# Patient Record
Sex: MALE | URBAN - METROPOLITAN AREA
[De-identification: names, ages, dates, MRNs, and addresses within clinical notes are randomized per-mention and may not be internally consistent; named-entity substitution may affect disease eponyms.]

---

## 2018-08-31 ENCOUNTER — TRANSFERRED RECORDS (OUTPATIENT)
Dept: HEALTH INFORMATION MANAGEMENT | Facility: CLINIC | Age: 73
End: 2018-08-31

## 2018-09-17 ENCOUNTER — TRANSFERRED RECORDS (OUTPATIENT)
Dept: HEALTH INFORMATION MANAGEMENT | Facility: CLINIC | Age: 73
End: 2018-09-17

## 2018-09-20 ENCOUNTER — TRANSFERRED RECORDS (OUTPATIENT)
Dept: HEALTH INFORMATION MANAGEMENT | Facility: CLINIC | Age: 73
End: 2018-09-20

## 2018-09-26 ENCOUNTER — TRANSFERRED RECORDS (OUTPATIENT)
Dept: HEALTH INFORMATION MANAGEMENT | Facility: CLINIC | Age: 73
End: 2018-09-26

## 2018-10-09 ENCOUNTER — TRANSFERRED RECORDS (OUTPATIENT)
Dept: HEALTH INFORMATION MANAGEMENT | Facility: CLINIC | Age: 73
End: 2018-10-09

## 2018-10-18 ENCOUNTER — PRE VISIT (OUTPATIENT)
Dept: UROLOGY | Facility: CLINIC | Age: 73
End: 2018-10-18

## 2018-10-25 NOTE — TELEPHONE ENCOUNTER
MEDICAL RECORDS REQUEST   [facility]  Urology Clinic  [address] [place] [address]  PHONE: [phone]  Fax: [phone]        FUTURE VISIT INFORMATION                                                   Julio Cesar Malin, : 1945 scheduled for future visit at [facility]    APPOINTMENT INFORMATION:    Date: 2018    Provider:  Indra Raymundo    Reason for Visit/Diagnosis: Kidney stones    REFERRAL INFORMATION:    Referring provider:  George cardoso    Specialty: MD    Referring providers clinic: [facility] contact number:  [phone]    RECORDS REQUESTED FOR VISIT                                                     NOTES  STATUS/DETAILS   OFFICE NOTE from referring provider  yes   OFFICE NOTE from other specialist  yes   DISCHARGE SUMMARY from hospital  no   DISCHARGE REPORT from the ER  no   OPERATIVE REPORT  no   MEDICATION LIST  yes   KIDNEY STONE     CT STONE  in process   IMAGING (IMAGES & REPORT)  in process       PRE-VISIT CHECKLIST      Record collection complete If no, please explain in process   Appointment appropriately scheduled           (right time/right provider) Yes   MyChart activation If no, please explain in process   Questionnaire complete If no, please explain in process     Completed by: Neelam Ibrahim

## 2018-10-26 ENCOUNTER — PRE VISIT (OUTPATIENT)
Dept: UROLOGY | Facility: CLINIC | Age: 73
End: 2018-10-26

## 2018-10-30 ENCOUNTER — OFFICE VISIT (OUTPATIENT)
Dept: UROLOGY | Facility: CLINIC | Age: 73
End: 2018-10-30
Payer: COMMERCIAL

## 2018-10-30 ENCOUNTER — PATIENT OUTREACH (OUTPATIENT)
Dept: CARE COORDINATION | Facility: CLINIC | Age: 73
End: 2018-10-30

## 2018-10-30 ENCOUNTER — RADIANT APPOINTMENT (OUTPATIENT)
Dept: CT IMAGING | Facility: CLINIC | Age: 73
End: 2018-10-30
Attending: UROLOGY
Payer: COMMERCIAL

## 2018-10-30 VITALS
DIASTOLIC BLOOD PRESSURE: 70 MMHG | BODY MASS INDEX: 26.68 KG/M2 | HEIGHT: 67 IN | HEART RATE: 57 BPM | SYSTOLIC BLOOD PRESSURE: 126 MMHG | WEIGHT: 170 LBS

## 2018-10-30 DIAGNOSIS — N20.0 CALCULUS OF KIDNEY: ICD-10-CM

## 2018-10-30 DIAGNOSIS — N20.0 CALCULUS OF KIDNEY: Primary | ICD-10-CM

## 2018-10-30 LAB
ALBUMIN UR-MCNC: NEGATIVE MG/DL
ANION GAP SERPL CALCULATED.3IONS-SCNC: 5 MMOL/L (ref 3–14)
APPEARANCE UR: CLEAR
BILIRUB UR QL STRIP: NEGATIVE
BUN SERPL-MCNC: 13 MG/DL (ref 7–30)
CALCIUM SERPL-MCNC: 8.7 MG/DL (ref 8.5–10.1)
CHLORIDE SERPL-SCNC: 107 MMOL/L (ref 94–109)
CO2 SERPL-SCNC: 29 MMOL/L (ref 20–32)
COLOR UR AUTO: ABNORMAL
CREAT SERPL-MCNC: 0.99 MG/DL (ref 0.66–1.25)
ERYTHROCYTE [DISTWIDTH] IN BLOOD BY AUTOMATED COUNT: 12.8 % (ref 10–15)
GFR SERPL CREATININE-BSD FRML MDRD: 74 ML/MIN/1.7M2
GLUCOSE SERPL-MCNC: 89 MG/DL (ref 70–99)
GLUCOSE UR STRIP-MCNC: NEGATIVE MG/DL
HCT VFR BLD AUTO: 47.7 % (ref 40–53)
HGB BLD-MCNC: 15.5 G/DL (ref 13.3–17.7)
HGB UR QL STRIP: ABNORMAL
KETONES UR STRIP-MCNC: NEGATIVE MG/DL
LEUKOCYTE ESTERASE UR QL STRIP: NEGATIVE
MCH RBC QN AUTO: 33 PG (ref 26.5–33)
MCHC RBC AUTO-ENTMCNC: 32.5 G/DL (ref 31.5–36.5)
MCV RBC AUTO: 102 FL (ref 78–100)
MUCOUS THREADS #/AREA URNS LPF: PRESENT /LPF
NITRATE UR QL: NEGATIVE
PH UR STRIP: 6 PH (ref 5–7)
PLATELET # BLD AUTO: 200 10E9/L (ref 150–450)
POTASSIUM SERPL-SCNC: 4.3 MMOL/L (ref 3.4–5.3)
RBC # BLD AUTO: 4.69 10E12/L (ref 4.4–5.9)
RBC #/AREA URNS AUTO: 2 /HPF (ref 0–2)
SODIUM SERPL-SCNC: 141 MMOL/L (ref 133–144)
SOURCE: ABNORMAL
SP GR UR STRIP: 1.01 (ref 1–1.03)
UROBILINOGEN UR STRIP-MCNC: 0 MG/DL (ref 0–2)
WBC # BLD AUTO: 6.7 10E9/L (ref 4–11)
WBC #/AREA URNS AUTO: 1 /HPF (ref 0–5)

## 2018-10-30 RX ORDER — ATORVASTATIN CALCIUM 80 MG/1
TABLET, FILM COATED ORAL
COMMUNITY
Start: 2018-10-23

## 2018-10-30 RX ORDER — TAMSULOSIN HYDROCHLORIDE 0.4 MG/1
CAPSULE ORAL
COMMUNITY
Start: 2018-08-31

## 2018-10-30 RX ORDER — NITROGLYCERIN 0.4 MG/1
TABLET SUBLINGUAL
COMMUNITY
Start: 2018-01-22

## 2018-10-30 RX ORDER — LEVOTHYROXINE SODIUM 75 UG/1
TABLET ORAL
COMMUNITY
Start: 2018-10-23

## 2018-10-30 RX ORDER — METOPROLOL SUCCINATE 25 MG/1
TABLET, EXTENDED RELEASE ORAL
COMMUNITY
Start: 2018-10-23

## 2018-10-30 ASSESSMENT — ENCOUNTER SYMPTOMS
HEMATURIA: 0
BOWEL INCONTINENCE: 0
DIFFICULTY URINATING: 0
FLANK PAIN: 1
BLOATING: 0
RECTAL PAIN: 0
ABDOMINAL PAIN: 1
VOMITING: 1
CONSTIPATION: 0
DIARRHEA: 1
HEARTBURN: 1
NAUSEA: 1
BLOOD IN STOOL: 0
JAUNDICE: 0
DYSURIA: 1

## 2018-10-30 ASSESSMENT — PAIN SCALES - GENERAL: PAINLEVEL: NO PAIN (0)

## 2018-10-30 NOTE — PATIENT INSTRUCTIONS
Dr. Raymundo will contact you regarding a surgery date/time.    It was a pleasure meeting with you today.  Thank you for allowing me and my team the privilege of caring for you today.  YOU are the reason we are here, and I truly hope we provided you with the excellent service you deserve.  Please let us know if there is anything else we can do for you so that we can be sure you are leaving completely satisfied with your care experience.        JANNA Sevilla

## 2018-10-30 NOTE — NURSING NOTE
Chief Complaint   Patient presents with     Consult     New patient consult for kidney stones     Amanda Martinez, A

## 2018-10-30 NOTE — MR AVS SNAPSHOT
After Visit Summary   10/30/2018    Julio Cesar Malin    MRN: [id number]           Patient Information     Date Of Birth          1945        Visit Information        Provider Department      10/30/2018 12:20 PM Indra Raymundo MD [facility]        Today's Diagnoses     Calculus of kidney    -  1      Care Instructions    Dr. Raymundo will contact you regarding a surgery date/time.    It was a pleasure meeting with you today.  Thank you for allowing me and my team the privilege of caring for you today.  YOU are the reason we are here, and I truly hope we provided you with the excellent service you deserve.  Please let us know if there is anything else we can do for you so that we can be sure you are leaving completely satisfied with your care experience.        Amanad Martinez ERIKA          Follow-ups after your visit        Additional Services     PAC Visit Referral (For [facility] Only)       Does this visit require an Anesthesia consult?  Yes - Evaluate for medical necessity related to one of the following conditions:  Hx of cardiac stent    H&P done by:  PCP      Please be aware that coverage of these services is subject to the terms and limitations of your health insurance plan.  Call member services at your health plan with any benefit or coverage questions.      Please bring the following to your appointment:  >>   Any x-rays, CTs or MRIs which have been performed.  Contact the facility where they were done to arrange for  prior to your scheduled appointment.  Any new CT, MRI or other procedures ordered by your specialist must be performed at a [place] facility or coordinated by your clinic's referral office.    >>   List of current medications  >>   This referral request   >>   Any documents/labs given to you for this referral                  Your next 10 appointments already scheduled     Oct 30, 2018  8:00 PM CDT   CT ABDOMEN PELVIS  W/O CONTRAST with UCCT1   [facility] ([facility])    [address]  1st Floor  [place] [address] [phone]           How do I prepare for my exam? (Food and drink instructions) No Food and Drink Restrictions.  How do I prepare for my exam? (Other instructions) You do not need to do anything special to prepare for this exam. For a sinus scan: Use your nose spray (nasal decongestant spray) as directed.  What should I wear: Please wear loose clothing, such as a sweat suit or jogging clothes. Avoid snaps, zippers and other metal. We may ask you to undress and put on a hospital gown.  How long does the exam take: Most scans take less than 20 minutes.  What should I bring: Please bring any scans or X-rays taken at other hospitals, if similar tests were done. Also bring a list of your medicines, including vitamins, minerals and over-the-counter drugs. It is safest to leave personal items at home.  Do I need a : No  is needed.  What do I need to tell my doctor? Be sure to tell your doctor: * If you have any allergies. * If there s any chance you are pregnant. * If you are breastfeeding.  What should I do after the exam: No restrictions, you may resume normal activities.  What is this test: A CT (computed tomography) scan is a series of pictures that allows us to look inside your body. The scanner creates images of the body in cross sections, much like slices of bread. This helps us see any problems more clearly.  Who should I call with questions: If you have any questions, please call the Imaging Department where you will have your exam. Directions, parking instructions, and other information are available on our website, [email or web address].              Who to contact     Please call your clinic at [phone] to:    Ask questions about your health    Make or cancel appointments    Discuss your medicines    Learn about your test results    Speak to  "your doctor            Additional Information About Your Visit        MyChart Information     [employer] is an electronic gateway that provides easy, online access to your medical records. With [employer], you can request a clinic appointment, read your test results, renew a prescription or communicate with your care team.     To sign up for [employer] visit the website at [email or web address]   You will be asked to enter the access code listed below, as well as some personal information. Please follow the directions to create your username and password.     Your access code is: [email or web address]  Expires: 2019  6:30 AM     Your access code will  in 90 days. If you need help or a new code, please contact your [facility] or call [phone] for assistance.        Care EveryWhere ID     This is your Care EveryWhere ID. This could be used by other organizations to access your [facility] medical records  [id number]        Your Vitals Were     Pulse Height BMI (Body Mass Index)             57 1.689 m (5' 6.5\") 27.03 kg/m2          Blood Pressure from Last 3 Encounters:   10/30/18 126/70    Weight from Last 3 Encounters:   10/30/18 77.1 kg (170 lb)              We Performed the Following     PAC Visit Referral (For [facility] Only)     Yaima-Operative Worksheet (Urology)     Urine Culture Aerobic Bacterial        Primary Care Provider    None Specified       No primary provider on file.        Equal Access to Services     CHARBEL MORALEZ : Hadii ade Arriaza, waaxda luqadaha, qaybta kaalmasamson song . So [place] -[phone].    ATENCIÓN: Si habla español, tiene a panchal disposición servicios gratuitos de asistencia lingüística. Carlene al [phone].    We comply with applicable federal civil rights laws and [place] laws. We do not discriminate on the basis of race, color, national origin, age, disability, sex, sexual orientation, or gender " identity.            Thank you!     Thank you for choosing [facility]OLOG[facility] AND [facility]  for your care. Our goal is always to provide you with excellent care. Hearing back from our patients is one way we can continue to improve our services. Please take a few minutes to complete the written survey that you may receive in the mail after your visit with us. Thank you!             Your Updated Medication List - Protect others around you: Learn how to safely use, store and throw away your medicines at www.disposemymeds.org.          This list is accurate as of 10/30/18  2:14 PM.  Always use your most recent med list.                   Brand Name Dispense Instructions for use Diagnosis    atorvastatin 80 MG tablet    LIPITOR          levothyroxine 75 MCG tablet    SYNTHROID/LEVOTHROID          metFORMIN 500 MG tablet    GLUCOPHAGE          metoprolol succinate 25 MG 24 hr tablet    TOPROL-XL          nitroGLYcerin 0.4 MG sublingual tablet    NITROSTAT          tamsulosin 0.4 MG capsule    FLOMAX

## 2018-10-30 NOTE — PROGRESS NOTES
Chief Complaint:   Kidney Stone         History of Present Illness:    Julio Cesar Malin is a very pleasant 73 year old male who presents with a history of left large renal stones.[place] he has previously had attempted but incomplete treatment of his stones via a ureteroscopic approach. He underwent left ureteral stent placement on 9/7/18, no ureteroscopy at that time due to presumed UTI.  He was treated and then ureteroscopy attempted on 9/17/18 with incomplete treatment of the stone.  Stent has since been removed.  He was advised to be seen at an academic center and chose [facility] as [place]    Currently describes intermittent pain on left side.  Denies hematuria, dysuria, fevers, chills.    First stone: Current stones  Number of stone surgeries: Ureteroscopy 9/2018  Number of stones passed spontaneously: 0  History of UTI: Denies  Prior Stone Analysis: No records of this  Family History Nephrolithasis: Denies  Stone Risk Factors: none  Prior Metabolic Workup: no           Past Medical History:   CABG  Glucose intolerance         Past Surgical History:   Ureteroscopy  Heel surgery 004         Medications     Current Outpatient Prescriptions   Medication     atorvastatin (LIPITOR) 80 MG tablet     levothyroxine (SYNTHROID/LEVOTHROID) 75 MCG tablet     metFORMIN (GLUCOPHAGE) 500 MG tablet     metoprolol succinate (TOPROL-XL) 25 MG 24 hr tablet     nitroGLYcerin (NITROSTAT) 0.4 MG sublingual tablet     tamsulosin (FLOMAX) 0.4 MG capsule     No current facility-administered medications for this visit.             Family History:   History reviewed. No pertinent family history.         Social History:     Social History     Social History     Marital status:      Spouse name: N/A     Number of children: N/A     Years of education: N/A     Occupational History     Not on file.     Social History Main Topics     Smoking status: Never  "Smoker     Smokeless tobacco: Never Used     Alcohol use Not on file     Drug use: Not on file     Sexual activity: Not on file     Other Topics Concern     Not on file     Social History Narrative     No narrative on file            Allergies:   Review of patient's allergies indicates no known allergies.         Review of Systems:  From intake questionnaire   Negative 14 system review except as noted on HPI, nurse's note.         Physical Exam:   Patient is a 73 year old  male   Vitals: Blood pressure 126/70, pulse 57, height 1.689 m (5' 6.5\"), weight 77.1 kg (170 lb).  General Appearance Adult: Alert, no acute distress, oriented  HENT: throat/mouth:normal, good dentition  Neck: No adenopathy,masses or thyromegaly  Lungs: no respiratory distress, or pursed lip breathing  Heart: No obvious jugular venous distension present  Abdomen: soft, nontender, no organomegaly or masses, Body mass index is 27.03 kg/(m^2).  Lymphatics: No cervical or supraclavicular adenopathy  Musculoskeltal: extremities normal, no peripheral edema  Skin: no suspicious lesions or rashes  Neuro: Alert, oriented, speech and mentation normal  Psych: affect and mood normal  Gait: Normal        Labs and Pathology:    I personally reviewed all applicable laboratory data and went over findings with patient  Significant for:  Lab Results   Component Value Date    WBC 6.7 10/30/2018     Lab Results   Component Value Date    RBC 4.69 10/30/2018     Lab Results   Component Value Date    HGB 15.5 10/30/2018     Lab Results   Component Value Date    HCT 47.7 10/30/2018     No components found for: MCT  Lab Results   Component Value Date     10/30/2018     Lab Results   Component Value Date    MCH 33.0 10/30/2018     Lab Results   Component Value Date    MCHC 32.5 10/30/2018     Lab Results   Component Value Date    RDW 12.8 10/30/2018     Lab Results   Component Value Date     10/30/2018     Last Comprehensive Metabolic Panel:  Sodium   Date " Value Ref Range Status   10/30/2018 141 133 - 144 mmol/L Final     Potassium   Date Value Ref Range Status   10/30/2018 4.3 3.4 - 5.3 mmol/L Final     Chloride   Date Value Ref Range Status   10/30/2018 107 94 - 109 mmol/L Final     Carbon Dioxide   Date Value Ref Range Status   10/30/2018 29 20 - 32 mmol/L Final     Anion Gap   Date Value Ref Range Status   10/30/2018 5 3 - 14 mmol/L Final     Glucose   Date Value Ref Range Status   10/30/2018 89 70 - 99 mg/dL Final     Urea Nitrogen   Date Value Ref Range Status   10/30/2018 13 7 - 30 mg/dL Final     Creatinine   Date Value Ref Range Status   10/30/2018 0.99 0.66 - 1.25 mg/dL Final     GFR Estimate   Date Value Ref Range Status   10/30/2018 74 >60 mL/min/1.7m2 Final     Comment:     Non  GFR Calc     Calcium   Date Value Ref Range Status   10/30/2018 8.7 8.5 - 10.1 mg/dL Final     Color Urine (no units)   Date Value   10/30/2018 Straw     Appearance Urine (no units)   Date Value   10/30/2018 Clear     Glucose Urine (mg/dL)   Date Value   10/30/2018 Negative     Bilirubin Urine (no units)   Date Value   10/30/2018 Negative     Ketones Urine (mg/dL)   Date Value   10/30/2018 Negative     Specific Gravity Urine (no units)   Date Value   10/30/2018 1.010     pH Urine (pH)   Date Value   10/30/2018 6.0     Protein Albumin Urine (mg/dL)   Date Value   10/30/2018 Negative     Nitrite Urine (no units)   Date Value   10/30/2018 Negative     Leukocyte Esterase Urine (no units)   Date Value   10/30/2018 Negative           Imaging:    I personally reviewed all applicable imaging and went over the below findings with patient.  CT 10/30     Abdomen and pelvis:   Numerous left renal stones ranging from 2-14 mm including a 14 mm  stone causing obstruction at the left UPJ. Mild associated  hydronephrosis. No additional ureteral stones.  No right-sided renal  stones. Exophytic right superior pole renal cyst. Several exophytic  left interpolar renal cysts. Urinary  bladder is incompletely  distended, limiting evaluation. Prostate is not enlarged.     Unenhanced liver and spleen are normal. Cholelithiasis. Negative  adrenal glands. Pancreas is unremarkable. No abdominal aortic  aneurysm. Mild aortobiiliac atherosclerotic plaque deposition. No  bowel obstruction. Normal appendix. Prominent fat in the left inguinal  canal. Small fat-containing umbilical hernia. No abdominal pelvic  lymphadenopathy.     Lung bases: No pulmonary parenchymal consolidation, pleural effusion,  or suspicious pulmonary nodule. Mild dependent changes, right more so  than left. There are a few calcified granulomas.     Bones and soft tissues: No suspicious lesions in the bones.  Degenerative changes at the hips and throughout the spine as well as  at the pubic symphysis. Bones appear to be osteopenic. Partially  imaged median sternotomy wires.         IMPRESSION:   1. 14 mm stone causing obstruction at the left UPJ.  2. Additional smaller nonobstructing left renal stones range from 2-8  mm.             Assessment and Plan:     Assessment: 73 year old male w/ nephrolithiasis, large left renal stone burden with hx of incomplete ureteroscopy    Plan:  We discussed shock wave lithotripsy, ureteroscopy and percutaneous nephrolithotomy as the main surgical approaches to upper urinary tract stones.  We reviewed risks and benefits including but not limited to the following: bleeding, infection, damage to adjacent organs, ureteral stricture, need for staged treatment, incomplete stone removal.    Ultimately the patient has elected to proceed with PCNL because of the size of stone burden in the left kidney and preference for definitive management.     We will make arrangements for surgery in the near future with PAC    Contact cell phone [phone]      Preop cipro    Scribe Disclosure:   Preet DOOLEY, am serving as a scribe; to document services personally performed by Indra Raymundo MD based on  data collection and the provider's statements to me.     I, Indra Raymundo saw and evaluated this patient and agree with the plan as stated above.  I personally performed all listed procedures.       Answers for HPI/ROS submitted by the patient on 10/30/2018   General Symptoms: No  Skin Symptoms: No  HENT Symptoms: No  EYE SYMPTOMS: No  HEART SYMPTOMS: No  LUNG SYMPTOMS: No  INTESTINAL SYMPTOMS: Yes  URINARY SYMPTOMS: Yes  REPRODUCTIVE SYMPTOMS: No  SKELETAL SYMPTOMS: No  BLOOD SYMPTOMS: No  NERVOUS SYSTEM SYMPTOMS: No  MENTAL HEALTH SYMPTOMS: No  Heart burn or indigestion: Yes  Nausea: Yes  Vomiting: Yes  Abdominal pain: Yes  Bloating: No  Constipation: No  Diarrhea: Yes  Blood in stool: No  Black stools: No  Rectal or Anal pain: No  Fecal incontinence: No  Yellowing of skin or eyes: No  Vomit with blood: No  Change in stools: Yes  Trouble holding urine or incontinence: Yes  Pain or burning: Yes  Trouble starting or stopping: No  Increased frequency of urination: Yes  Blood in urine: No  Decreased frequency of urination: No  Frequent nighttime urination: Yes  Flank pain: Yes  Difficulty emptying bladder: No  PHQ-2 Score: 0

## 2018-10-30 NOTE — LETTER
10/30/2018       RE: Julio Cesar Malin  [address]  [facility] [address]     Dear Colleague,    Thank you for referring your patient, Julio Cesar Malin, to the [facility] UROLOGY AND INST FOR PROSTATE AND UROLOGIC CANCERS at [facility]. Please see a copy of my visit note below.          Chief Complaint:   Kidney Stone         History of Present Illness:    Julio Cesar Malin is a very pleasant 73 year old male who presents with a history of left large renal stones.[place] where he has previously had attempted but incomplete treatment of his stones via a ureteroscopic approach. He underwent left ureteral stent placement on 9/7/18, no ureteroscopy at that time due to presumed UTI.  He was treated and then ureteroscopy attempted on 9/17/18 with incomplete treatment of the stone.  Stent has since been removed.  He was advised to be seen at an academic center and chose [facility] as he has family in the [place].    Currently describes intermittent pain on left side.  Denies hematuria, dysuria, fevers, chills.    First stone: Current stones  Number of stone surgeries: Ureteroscopy 9/2018  Number of stones passed spontaneously: 0  History of UTI: Denies  Prior Stone Analysis: No records of this  Family History Nephrolithasis: Denies  Stone Risk Factors: none  Prior Metabolic Workup: no           Past Medical History:   CABG  Glucose intolerance         Past Surgical History:   Ureteroscopy  Heel surgery 004         Medications     Current Outpatient Prescriptions   Medication     atorvastatin (LIPITOR) 80 MG tablet     levothyroxine (SYNTHROID/LEVOTHROID) 75 MCG tablet     metFORMIN (GLUCOPHAGE) 500 MG tablet     metoprolol succinate (TOPROL-XL) 25 MG 24 hr tablet     nitroGLYcerin (NITROSTAT) 0.4 MG sublingual tablet     tamsulosin (FLOMAX) 0.4 MG capsule     No current facility-administered medications for this visit.             Family History:  "  History reviewed. No pertinent family history.         Social History:     Social History     Social History     Marital status:      Spouse name: N/A     Number of children: N/A     Years of education: N/A     Occupational History     Not on file.     Social History Main Topics     Smoking status: Never Smoker     Smokeless tobacco: Never Used     Alcohol use Not on file     Drug use: Not on file     Sexual activity: Not on file     Other Topics Concern     Not on file     Social History Narrative     No narrative on file            Allergies:   Review of patient's allergies indicates no known allergies.         Physical Exam:   Patient is a 73 year old  male   Vitals: Blood pressure 126/70, pulse 57, height 1.689 m (5' 6.5\"), weight 77.1 kg (170 lb).  General Appearance Adult: Alert, no acute distress, oriented  HENT: throat/mouth:normal, good dentition  Neck: No adenopathy,masses or thyromegaly  Lungs: no respiratory distress, or pursed lip breathing  Heart: No obvious jugular venous distension present  Abdomen: soft, nontender, no organomegaly or masses, Body mass index is 27.03 kg/(m^2).  Lymphatics: No cervical or supraclavicular adenopathy  Musculoskeltal: extremities normal, no peripheral edema  Skin: no suspicious lesions or rashes  Neuro: Alert, oriented, speech and mentation normal  Psych: affect and mood normal  Gait: Normal        Labs and Pathology:    I personally reviewed all applicable laboratory data and went over findings with patient  Significant for:  Lab Results   Component Value Date    WBC 6.7 10/30/2018     Lab Results   Component Value Date    RBC 4.69 10/30/2018     Lab Results   Component Value Date    HGB 15.5 10/30/2018     Lab Results   Component Value Date    HCT 47.7 10/30/2018     No components found for: MCT  Lab Results   Component Value Date     10/30/2018     Lab Results   Component Value Date    MCH 33.0 10/30/2018     Lab Results   Component Value Date    MCHC " 32.5 10/30/2018     Lab Results   Component Value Date    RDW 12.8 10/30/2018     Lab Results   Component Value Date     10/30/2018     Last Comprehensive Metabolic Panel:  Sodium   Date Value Ref Range Status   10/30/2018 141 133 - 144 mmol/L Final     Potassium   Date Value Ref Range Status   10/30/2018 4.3 3.4 - 5.3 mmol/L Final     Chloride   Date Value Ref Range Status   10/30/2018 107 94 - 109 mmol/L Final     Carbon Dioxide   Date Value Ref Range Status   10/30/2018 29 20 - 32 mmol/L Final     Anion Gap   Date Value Ref Range Status   10/30/2018 5 3 - 14 mmol/L Final     Glucose   Date Value Ref Range Status   10/30/2018 89 70 - 99 mg/dL Final     Urea Nitrogen   Date Value Ref Range Status   10/30/2018 13 7 - 30 mg/dL Final     Creatinine   Date Value Ref Range Status   10/30/2018 0.99 0.66 - 1.25 mg/dL Final     GFR Estimate   Date Value Ref Range Status   10/30/2018 74 >60 mL/min/1.7m2 Final     Comment:     Non  GFR Calc     Calcium   Date Value Ref Range Status   10/30/2018 8.7 8.5 - 10.1 mg/dL Final     Color Urine (no units)   Date Value   10/30/2018 Straw     Appearance Urine (no units)   Date Value   10/30/2018 Clear     Glucose Urine (mg/dL)   Date Value   10/30/2018 Negative     Bilirubin Urine (no units)   Date Value   10/30/2018 Negative     Ketones Urine (mg/dL)   Date Value   10/30/2018 Negative     Specific Gravity Urine (no units)   Date Value   10/30/2018 1.010     pH Urine (pH)   Date Value   10/30/2018 6.0     Protein Albumin Urine (mg/dL)   Date Value   10/30/2018 Negative     Nitrite Urine (no units)   Date Value   10/30/2018 Negative     Leukocyte Esterase Urine (no units)   Date Value   10/30/2018 Negative           Imaging:    I personally reviewed all applicable imaging and went over the below findings with patient.  CT 10/30     Abdomen and pelvis:   Numerous left renal stones ranging from 2-14 mm including a 14 mm  stone causing obstruction at the left UPJ.  Mild associated  hydronephrosis. No additional ureteral stones.  No right-sided renal  stones. Exophytic right superior pole renal cyst. Several exophytic  left interpolar renal cysts. Urinary bladder is incompletely  distended, limiting evaluation. Prostate is not enlarged.     Unenhanced liver and spleen are normal. Cholelithiasis. Negative  adrenal glands. Pancreas is unremarkable. No abdominal aortic  aneurysm. Mild aortobiiliac atherosclerotic plaque deposition. No  bowel obstruction. Normal appendix. Prominent fat in the left inguinal  canal. Small fat-containing umbilical hernia. No abdominal pelvic  lymphadenopathy.     Lung bases: No pulmonary parenchymal consolidation, pleural effusion,  or suspicious pulmonary nodule. Mild dependent changes, right more so  than left. There are a few calcified granulomas.     Bones and soft tissues: No suspicious lesions in the bones.  Degenerative changes at the hips and throughout the spine as well as  at the pubic symphysis. Bones appear to be osteopenic. Partially  imaged median sternotomy wires.         IMPRESSION:   1. 14 mm stone causing obstruction at the left UPJ.  2. Additional smaller nonobstructing left renal stones range from 2-8  mm.             Assessment and Plan:     Assessment: 73 year old male w/ nephrolithiasis, large left renal stone burden with hx of incomplete ureteroscopy    Plan:  We discussed shock wave lithotripsy, ureteroscopy and percutaneous nephrolithotomy as the main surgical approaches to upper urinary tract stones.  We reviewed risks and benefits including but not limited to the following: bleeding, infection, damage to adjacent organs, ureteral stricture, need for staged treatment, incomplete stone removal.    Ultimately the patient has elected to proceed with PCNL because of the size of stone burden in the left kidney and preference for definitive management.     We will make arrangements for surgery in the near future with  PAC    Contact cell phone [phone]      Preop cipro    Scribe Disclosure:   I,Preet Liu, am serving as a scribe; to document services personally performed by Indra Raymundo MD based on data collection and the provider's statements to me.     IIndra saw and evaluated this patient and agree with the plan as stated above.  I personally performed all listed procedures.     Again, thank you for allowing me to participate in the care of your patient.      Sincerely,    Indra Raymundo MD

## 2018-10-31 LAB
BACTERIA SPEC CULT: NO GROWTH
Lab: NORMAL
SPECIMEN SOURCE: NORMAL

## 2018-11-01 ENCOUNTER — AMBULATORY - HEALTHEAST (OUTPATIENT)
Dept: UROLOGY | Facility: CLINIC | Age: 73
End: 2018-11-01

## 2018-11-01 RX ORDER — CIPROFLOXACIN 500 MG/1
500 TABLET, FILM COATED ORAL 2 TIMES DAILY
Qty: 10 TABLET | Refills: 0 | Status: SHIPPED | OUTPATIENT
Start: 2018-11-01

## 2018-11-05 ENCOUNTER — RECORDS - HEALTHEAST (OUTPATIENT)
Dept: ADMINISTRATIVE | Facility: OTHER | Age: 73
End: 2018-11-05

## 2018-11-07 ASSESSMENT — MIFFLIN-ST. JEOR: SCORE: 1468.72

## 2018-11-12 ENCOUNTER — SURGERY - HEALTHEAST (OUTPATIENT)
Dept: SURGERY | Facility: CLINIC | Age: 73
End: 2018-11-12

## 2018-11-12 ENCOUNTER — ANESTHESIA - HEALTHEAST (OUTPATIENT)
Dept: SURGERY | Facility: CLINIC | Age: 73
End: 2018-11-12

## 2018-11-12 ENCOUNTER — HOSPITAL ENCOUNTER (OUTPATIENT)
Dept: INTERVENTIONAL RADIOLOGY/VASCULAR | Facility: CLINIC | Age: 73
Discharge: HOME | End: 2018-11-12
Attending: UROLOGY

## 2018-11-12 DIAGNOSIS — N20.0 RENAL STONES: ICD-10-CM

## 2018-11-12 ASSESSMENT — MIFFLIN-ST. JEOR
SCORE: 1458.4
SCORE: 1506.03

## 2018-11-14 ENCOUNTER — COMMUNICATION - HEALTHEAST (OUTPATIENT)
Dept: SCHEDULING | Facility: CLINIC | Age: 73
End: 2018-11-14

## 2018-11-16 ENCOUNTER — COMMUNICATION - HEALTHEAST (OUTPATIENT)
Dept: UROLOGY | Facility: CLINIC | Age: 73
End: 2018-11-16

## 2018-11-19 ENCOUNTER — TRANSFERRED RECORDS (OUTPATIENT)
Dept: HEALTH INFORMATION MANAGEMENT | Facility: CLINIC | Age: 73
End: 2018-11-19

## 2018-11-26 ENCOUNTER — TRANSFERRED RECORDS (OUTPATIENT)
Dept: HEALTH INFORMATION MANAGEMENT | Facility: CLINIC | Age: 73
End: 2018-11-26

## 2021-06-02 VITALS — BODY MASS INDEX: 27.94 KG/M2 | WEIGHT: 178 LBS | HEIGHT: 67 IN

## 2021-06-21 NOTE — ANESTHESIA CARE TRANSFER NOTE
Last vitals:   Vitals:    11/12/18 1309   BP: 127/66   Pulse: 65   Resp: 12   Temp: 36.5  C (97.7  F)   SpO2: 100%     Patient's level of consciousness is drowsy  Spontaneous respirations: yes  Maintains airway independently: yes  Dentition unchanged: yes  Oropharynx: oropharynx clear of all foreign objects    QCDR Measures:  ASA# 20 - Surgical Safety Checklist: WHO surgical safety checklist completed prior to induction    PQRS# 430 - Adult PONV Prevention: 4558F - Pt received => 2 anti-emetic agents (different classes) preop & intraop  ASA# 8 - Peds PONV Prevention: NA - Not pediatric patient, not GA or 2 or more risk factors NOT present  PQRS# 424 - Yaima-op Temp Management: 4559F - At least one body temp DOCUMENTED => 35.5C or 95.9F within required timeframe  PQRS# 426 - PACU Transfer Protocol: - Transfer of care checklist used  ASA# 14 - Acute Post-op Pain: ASA14B - Patient did NOT experience pain >= 7 out of 10

## 2021-06-21 NOTE — ANESTHESIA POSTPROCEDURE EVALUATION
Patient: Julio Cesar Malin  LEFT PERCUTANEOUS NEPHROLITHOTOMY CYSTOSCOPY LEFT STENT INSERTION, LASER LITHOTRIPSY  Anesthesia type: general    Patient location: PACU  Last vitals:   Vitals:    11/12/18 1540   BP: 107/59   Pulse: (!) 53   Resp: 16   Temp: 36.5  C (97.7  F)   SpO2: 98%     Post vital signs: stable  Level of consciousness: awake and responds to simple questions  Post-anesthesia pain: pain controlled  Post-anesthesia nausea and vomiting: no  Pulmonary: unassisted, return to baseline  Cardiovascular: stable and blood pressure at baseline  Hydration: adequate  Anesthetic events: no    QCDR Measures:  ASA# 11 - Yaima-op Cardiac Arrest: ASA11B - Patient did NOT experience unanticipated cardiac arrest  ASA# 12 - Yaima-op Mortality Rate: ASA12B - Patient did NOT die  ASA# 13 - PACU Re-Intubation Rate: ASA13B - Patient did NOT require a new airway mgmt  ASA# 10 - Composite Anes Safety: ASA10A - No serious adverse event    Additional Notes:

## 2021-06-21 NOTE — ANESTHESIA PREPROCEDURE EVALUATION
Anesthesia Evaluation      Patient summary reviewed     Airway   Mallampati: II  Neck ROM: full   Pulmonary - normal exam   (-) pneumonia, COPD, asthma, shortness of breath, recent URI, sleep apnea, not a smoker                         Cardiovascular   Exercise tolerance: > or = 4 METS  (+) hypertension, CAD, , hypercholesterolemia,     (-) angina, murmur  Rhythm: regular  Rate: normal,    no murmur      Neuro/Psych - negative ROS     Endo/Other    (+) diabetes mellitus, hypothyroidism,   (-) no obesity     GI/Hepatic/Renal    (+) GERD well controlled,   chronic renal disease,           Dental - normal exam                        Anesthesia Plan  Planned anesthetic: general endotracheal    ASA 3   Induction: intravenous   Anesthetic plan and risks discussed with: patient, spouse and child/children  Anesthesia plan special considerations: antiemetics,   Post-op plan: routine recovery